# Patient Record
Sex: MALE | Race: WHITE | URBAN - METROPOLITAN AREA
[De-identification: names, ages, dates, MRNs, and addresses within clinical notes are randomized per-mention and may not be internally consistent; named-entity substitution may affect disease eponyms.]

---

## 2022-04-27 ENCOUNTER — APPOINTMENT (OUTPATIENT)
Dept: URBAN - METROPOLITAN AREA CLINIC 237 | Age: 42
Setting detail: DERMATOLOGY
End: 2022-04-27

## 2022-04-27 DIAGNOSIS — L91.8 OTHER HYPERTROPHIC DISORDERS OF THE SKIN: ICD-10-CM

## 2022-04-27 DIAGNOSIS — L28.1 PRURIGO NODULARIS: ICD-10-CM

## 2022-04-27 PROCEDURE — OTHER INTRALESIONAL KENALOG: OTHER

## 2022-04-27 PROCEDURE — OTHER COUNSELING: OTHER

## 2022-04-27 PROCEDURE — OTHER ADDITIONAL NOTES: OTHER

## 2022-04-27 PROCEDURE — OTHER COSMETIC QUOTE: OTHER

## 2022-04-27 PROCEDURE — 11900 INJECT SKIN LESIONS </W 7: CPT

## 2022-04-27 ASSESSMENT — LOCATION DETAILED DESCRIPTION DERM
LOCATION DETAILED: RIGHT ANTERIOR SHOULDER
LOCATION DETAILED: LEFT AXILLARY VAULT

## 2022-04-27 ASSESSMENT — LOCATION SIMPLE DESCRIPTION DERM
LOCATION SIMPLE: RIGHT SHOULDER
LOCATION SIMPLE: LEFT AXILLARY VAULT

## 2022-04-27 ASSESSMENT — LOCATION ZONE DERM
LOCATION ZONE: AXILLAE
LOCATION ZONE: ARM

## 2022-04-27 NOTE — PROCEDURE: COSMETIC QUOTE
Laser 1 Units: 0
Implant 8 Price/Unit (In Dollars- Use Only Numbers And Decimals): 0.00
Misc Procedure 1 Price/Unit (In Dollars- Use Only Numbers And Decimals): 166.00
Include Sales Tax: No
Include Sales Tax On Surgeon's Fees: Yes
Misc Procedure 1: Skin Tag Removal (Cosmetic)
Misc Procedure 1 Units: 1
Sales Tax Percentage (Use Only Numbers And Decimals): 6.00
Detail Level: Zone

## 2022-04-27 NOTE — PROCEDURE: ADDITIONAL NOTES
Render Risk Assessment In Note?: no
Additional Notes: Patient not interested in removal as it would be cosmetic.
Detail Level: Simple

## 2022-04-27 NOTE — PROCEDURE: INTRALESIONAL KENALOG
Administered By (Optional): MARIAH
Total Volume Injected (Ccs- Only Use Numbers And Decimals): 0.1
X Size Of Lesion In Cm (Optional): 0
Detail Level: Detailed
Validate Note Data When Using Inventory: Yes
Kenalog Preparation: Kenalog
Medical Necessity Clause: This procedure was medically necessary because the lesions that were treated were:
Consent: The risks of atrophy were reviewed with the patient.
Concentration Of Solution Injected (Mg/Ml): 2.5